# Patient Record
(demographics unavailable — no encounter records)

---

## 2025-02-10 NOTE — ASSESSMENT
[FreeTextEntry1] : 32-year-old gentleman who presented to [facility] 12/23 with CT scan identifying a large mass that was abutting cecum. Also noticed on the CT scan that the patient had a lesion in the bladder. Now s/p TURBT 12/23 and right hemicolectomy.  Path: Leiomyoma. Denies hematuria, abdominal pain, dysuria CT 4/12/23: Bladder mass 2.cm<3 Cystoscopy 12/11/23, mass smaller MRI generally 2025: Mass with reduced in size.  Patient to fax over MRI report.  I explained the benign nature of leiomyomas. I do not think it is dangerous however given its size and location will require surveillance and ultimately resection if grows or becomes bothersome.   Will plan for imaging in 1 year.

## 2025-02-10 NOTE — HISTORY OF PRESENT ILLNESS
[FreeTextEntry1] : The patient is a 30-year-old gentleman who presented to [facility] with history of fever and subsequently developed severe abdominal pain.  The patient denied any weight loss, any change in bowel habit, and the symptoms developed very acutely.   He was then sent for a CT scan, which showed a large mass that was abutting cecum .  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 Also noticed on the CT scan that the patient had a lesion in the bladder. Now s/p TURBT and right hemicolectomy.   No urinary symptoms. Afebrile. Urine does look brown.  Path: Leiomyoma  2/10/25 FU Denies hematuria, abdominal pain, dysuria CT 4/12/23: Bladder mass 2.cm<3 Cystoscopy 12/11/23, mass smaller MRI generally 2025: Mass with reduced in size.  Patient to fax over MRI report. Urine clear

## 2025-03-17 NOTE — HISTORY OF PRESENT ILLNESS
[Other Location: e.g. School (Enter Location, City,State)___] : at [unfilled], at the time of the visit. [Medical Office: ([facility])___] : at the medical office located in  [Telehealth (audio & video)] : This visit was provided via telehealth using real-time 2-way audio visual technology. [Verbal consent obtained from patient] : the patient, [unfilled] [FreeTextEntry1] : 32-year-old gentleman initially presented distal ileal mass and bladder mass, s/p TURBT and right hemicolectomy 11/2022.  Path: Leiomyoma CT 4/12/23: Bladder mass 2cm Cystoscopy 12/11/23, mass smaller CT scan 1/25: Bladder mass similar in size (see scanned report).  Currently using sildenafil 50mg as needed with good response of ED. Denies side effects.  Denies hematuria, abdominal pain, dysuria

## 2025-03-17 NOTE — ASSESSMENT
[FreeTextEntry1] : 32-year-old male.  Bladder leiomyoma, s/p TURBT and right hemicolectomy 11/2022. Path: Leiomyoma, Stable imaging.  Discussed surveillance.  ED, continue sildenafil as needed. Based on the patient's history of ED we discussed PDE5i as first line therapy.The patient understands that the risks of this medication include facial redness, flushing, GERD, back pain, priapism, chest pain/MI, dizziness, drop in BP, loss of vision, blurry vision and loss of hearing. He will come to the ER for priapism, chest pain, or loss of vision or hearing. He knows that the medication may take up to 45 minutes to work (or longer on a full stomach) and requires sexual stimulation. The patient understood all of these instructions.